# Patient Record
Sex: MALE | Race: OTHER | HISPANIC OR LATINO | Employment: STUDENT | URBAN - METROPOLITAN AREA
[De-identification: names, ages, dates, MRNs, and addresses within clinical notes are randomized per-mention and may not be internally consistent; named-entity substitution may affect disease eponyms.]

---
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
## 2025-01-30 ENCOUNTER — HOSPITAL ENCOUNTER (EMERGENCY)
Facility: HOSPITAL | Age: 20
Discharge: HOME | End: 2025-01-30
Attending: STUDENT IN AN ORGANIZED HEALTH CARE EDUCATION/TRAINING PROGRAM
Payer: COMMERCIAL

## 2025-01-30 VITALS
BODY MASS INDEX: 20.41 KG/M2 | HEIGHT: 69 IN | WEIGHT: 137.79 LBS | HEART RATE: 64 BPM | DIASTOLIC BLOOD PRESSURE: 75 MMHG | SYSTOLIC BLOOD PRESSURE: 121 MMHG | TEMPERATURE: 98.1 F | OXYGEN SATURATION: 100 % | RESPIRATION RATE: 16 BRPM

## 2025-01-30 DIAGNOSIS — H65.01 RIGHT ACUTE SEROUS OTITIS MEDIA, RECURRENCE NOT SPECIFIED: Primary | ICD-10-CM

## 2025-01-30 PROCEDURE — 2500000001 HC RX 250 WO HCPCS SELF ADMINISTERED DRUGS (ALT 637 FOR MEDICARE OP): Performed by: STUDENT IN AN ORGANIZED HEALTH CARE EDUCATION/TRAINING PROGRAM

## 2025-01-30 PROCEDURE — 99282 EMERGENCY DEPT VISIT SF MDM: CPT | Performed by: STUDENT IN AN ORGANIZED HEALTH CARE EDUCATION/TRAINING PROGRAM

## 2025-01-30 PROCEDURE — 99283 EMERGENCY DEPT VISIT LOW MDM: CPT

## 2025-01-30 RX ORDER — ACETAMINOPHEN 325 MG/1
975 TABLET ORAL ONCE
Status: COMPLETED | OUTPATIENT
Start: 2025-01-30 | End: 2025-01-30

## 2025-01-30 RX ORDER — AMOXICILLIN AND CLAVULANATE POTASSIUM 875; 125 MG/1; MG/1
1 TABLET, FILM COATED ORAL EVERY 12 HOURS
Qty: 20 TABLET | Refills: 0 | Status: SHIPPED | OUTPATIENT
Start: 2025-01-30 | End: 2025-02-09

## 2025-01-30 RX ORDER — AMOXICILLIN AND CLAVULANATE POTASSIUM 875; 125 MG/1; MG/1
1 TABLET, FILM COATED ORAL ONCE
Status: COMPLETED | OUTPATIENT
Start: 2025-01-30 | End: 2025-01-30

## 2025-01-30 RX ADMIN — ACETAMINOPHEN 975 MG: 325 TABLET ORAL at 03:08

## 2025-01-30 RX ADMIN — AMOXICILLIN AND CLAVULANATE POTASSIUM 1 TABLET: 875; 125 TABLET, FILM COATED ORAL at 03:08

## 2025-01-30 ASSESSMENT — COLUMBIA-SUICIDE SEVERITY RATING SCALE - C-SSRS
2. HAVE YOU ACTUALLY HAD ANY THOUGHTS OF KILLING YOURSELF?: NO
6. HAVE YOU EVER DONE ANYTHING, STARTED TO DO ANYTHING, OR PREPARED TO DO ANYTHING TO END YOUR LIFE?: NO
1. IN THE PAST MONTH, HAVE YOU WISHED YOU WERE DEAD OR WISHED YOU COULD GO TO SLEEP AND NOT WAKE UP?: NO

## 2025-01-30 ASSESSMENT — PAIN DESCRIPTION - LOCATION: LOCATION: EAR

## 2025-01-30 ASSESSMENT — PAIN - FUNCTIONAL ASSESSMENT: PAIN_FUNCTIONAL_ASSESSMENT: 0-10

## 2025-01-30 ASSESSMENT — PAIN DESCRIPTION - DESCRIPTORS: DESCRIPTORS: PRESSURE

## 2025-01-30 ASSESSMENT — PAIN DESCRIPTION - FREQUENCY: FREQUENCY: CONSTANT/CONTINUOUS

## 2025-01-30 ASSESSMENT — PAIN DESCRIPTION - PAIN TYPE: TYPE: ACUTE PAIN

## 2025-01-30 ASSESSMENT — PAIN DESCRIPTION - PROGRESSION: CLINICAL_PROGRESSION: NOT CHANGED

## 2025-01-30 ASSESSMENT — PAIN DESCRIPTION - ONSET: ONSET: SUDDEN

## 2025-01-30 ASSESSMENT — PAIN SCALES - GENERAL: PAINLEVEL_OUTOF10: 8

## 2025-01-30 ASSESSMENT — PAIN DESCRIPTION - ORIENTATION: ORIENTATION: RIGHT

## 2025-01-30 NOTE — ED PROVIDER NOTES
HPI   Chief Complaint   Patient presents with    Earache     Pt right ear started to hurt today.       This is a 19-year-old male brought to the ED by parents for evaluation of right ear pain.  He states that it started today, he is having trouble sleeping because of the pain.  He took ibuprofen at home with improvement of pain but not complete resolution.  He denies any hearing loss or ringing in the ears from this.  He has no pain or swelling to the external ear.  No fevers or chills, nausea or vomiting.  Denies cough or congestion or recent illness otherwise.  No sore throat.        History provided by:  Patient   used: No            Patient History   History reviewed. No pertinent past medical history.  History reviewed. No pertinent surgical history.  No family history on file.  Social History     Tobacco Use    Smoking status: Not on file    Smokeless tobacco: Not on file   Substance Use Topics    Alcohol use: Not on file    Drug use: Not on file       Physical Exam   ED Triage Vitals [01/30/25 0235]   Temperature Heart Rate Respirations BP   36.7 °C (98.1 °F) 64 16 121/75      Pulse Ox Temp Source Heart Rate Source Patient Position   100 % Temporal Monitor Sitting      BP Location FiO2 (%)     Right arm --       Physical Exam  General: well developed, well nourished 19-year-old male who is awake and alert, in no apparent distress  Eyes: sclera clear bilaterally, PERRL, EOMI  HENT: normocephalic, atraumatic. Pharynx without erythema or exudates, uvula midline.  Right EAC is clear, there is bulging of the TM with purulence present consistent with otitis media.  Skin: warm, dry, without evidence of rash or abrasions    ED Course & MDM   Diagnoses as of 01/30/25 0500   Right acute serous otitis media, recurrence not specified                 No data recorded     [place] Coma Scale Score: 15 (01/30/25 0309 : Nyla Child RN)                           Medical Decision Making  He is in no  acute distress in the ED.  He is awake and alert, oriented x 4.  Parents at bedside.  Vitals normal.  External ear appears normal, there is no proptosis of the pinna or tenderness over the mastoid bone.  He has no systemic signs of illness.  On exam there is no otitis externa, TM is bulging with some purulence suggestive of otitis media.  He was given Tylenol and started on Augmentin, first dose given in the ED.  When asked, he did not want anything stronger for pain.  He is satisfied with continued treatment outpatient antibiotics.  He was discharged in stable condition.  Written return precautions given including signs of worsening infection or hearing loss.    Procedure  Procedures     Spenser Baxter DO  01/30/25 0509

## 2025-01-30 NOTE — DISCHARGE INSTRUCTIONS
Continue using Tylenol and ibuprofen as needed for pain control.  You can take them in alternating doses every 4 hours to make sure you are covered at all times with 1 medicine or the other.  Start Augmentin twice per day for the next 10 days for treatment of your ear infection.  Return to the emergency department if you have worsening hearing loss, fevers and chills, nausea and vomiting, or significant swelling of the ear or the area behind the ear which could be signs of worsening infection.